# Patient Record
(demographics unavailable — no encounter records)

---

## 2024-12-28 NOTE — REVIEW OF SYSTEMS
[Fatigue] : fatigue [Muscle Weakness] : muscle weakness [Confused] : confusion [Dizziness] : no dizziness [Difficulty Walking] : difficulty walking [Negative] : Integumentary

## 2024-12-28 NOTE — HISTORY OF PRESENT ILLNESS
[de-identified] : Neri Robledo  is an 84-year-old male with a history of metastatic prostate cancer, heart failure, hypertension, tobacco use, aortic stenosis with TAVR, and extensive prior treatment for his cancer, presenting to establish care and continue management. His prostate cancer was initially diagnosed several years ago and treated with brachytherapy and androgen deprivation therapy (ADT), though details of these treatments are unclear due to limited records.  In November 2021, during evaluation for cardiac issues at [facility], his PSA was elevated to 1,145. He was subsequently started on degarelix in January 2022 and enzalutamide. However, in April 2022, enzalutamide was discontinued following hospitalization for dyspnea and dizziness, and he was transitioned to abiraterone and prednisone. By July 2022, his PSA had decreased to 0.20. His most recent follow-up with Dr. Singh at [place] in July 2024 noted a PSA of 0.3. It is unclear when he last received degarelix since his last injection in July 2024, but nursing home records confirm he has continued abiraterone and prednisone.  The patient reports stable symptoms, with no new bony pain, chest pain, shortness of breath, abdominal pain, or changes in urinary or bowel habits. He denies neurologic symptoms.

## 2024-12-28 NOTE — ASSESSMENT
[FreeTextEntry1] : Neri Robledo is an 84-year-old male with metastatic prostate cancer and a complex medical history, presenting to establish care and continue hormonal therapy.    I reviewed prior treatment records, including degarelix, abiraterone, and prednisone. Repeat labs performed 12/26/24 show stable PSA from previous, with undetectable PSA. We will plan to resume degarelix in one week. He will continue abiraterone/prednisone at nursing home.  We will defer repeat imaging given lack of focal symptoms concerning for disease progression. If PSA increases or new symptoms arise, plan for CT chest/abdomen/pelvis and bone scan.       We will need to further evaluate his goals of care and health care proxy status at subsequent visit, as he appears to have limited capacity and arrived without a healthcare proxy today.      #Metastatic prostate cancer: -continue degarelix + amy/pred -monitor closely for toxicity -RTC  1 week for degarelix, 5 weeks visit + degarelix

## 2024-12-28 NOTE — REASON FOR VISIT
[Initial Consultation] : an initial consultation [Family Member] : family member [FreeTextEntry2] : Metastatic [facility]

## 2025-01-25 NOTE — PHYSICAL EXAM
No glasses Prescription given to patient. [Capable of only limited self care, confined to bed or chair more than 50% of waking hours] : Status 3- Capable of only limited self care, confined to bed or chair more than 50% of waking hours [Thin] : thin [Normal] : affect appropriate

## 2025-01-28 NOTE — ASSESSMENT
[FreeTextEntry1] : Neri Robledo is an 84-year-old male with metastatic prostate cancer and a complex medical history, presenting to establish care and continue hormonal therapy.  I previously reviewed prior treatment records, including degarelix, abiraterone, and prednisone. Repeat labs performed 12/26/24 show stable PSA from previous. We will plan to continue current regimen with degarelix every 4 weeks and amy/pred.  We will defer repeat imaging given lack of focal symptoms concerning for disease progression. If PSA increases or new symptoms arise, plan for CT chest/abdomen/pelvis and bone scan.  #Metastatic prostate cancer: -continue degarelix every 4 weeks -continue abiraterone 1000mg and prednisone 5mg daily -monitor closely for toxicity -RTC  4 weeks for degarelix only, 8 weeks for follow up visit and labs and degarelix

## 2025-01-28 NOTE — HISTORY OF PRESENT ILLNESS
[de-identified] : Neri Robledo  is an 84-year-old male with a history of metastatic prostate cancer, heart failure, hypertension, tobacco use, aortic stenosis with TAVR, and extensive prior treatment for his cancer, presenting to establish care and continue management. His prostate cancer was initially diagnosed several years ago and treated with brachytherapy and androgen deprivation therapy (ADT), though details of these treatments are unclear due to limited records.  In November 2021, during evaluation for cardiac issues at [facility], his PSA was elevated to 1,145. He was subsequently started on degarelix in January 2022 and enzalutamide. However, in April 2022, enzalutamide was discontinued following hospitalization for dyspnea and dizziness, and he was transitioned to abiraterone and prednisone. By July 2022, his PSA had decreased to 0.20. His most recent follow-up with Dr. Singh at [place] in July 2024 noted a PSA of 0.3. It is unclear when he last received degarelix since his last injection in July 2024, but nursing home records confirm he has continued abiraterone and prednisone.  The patient reports stable symptoms, with no new bony pain, chest pain, shortness of breath, abdominal pain, or changes in urinary or bowel habits. He denies neurologic symptoms. [de-identified] : He presents for follow up and to continue therapy. He denies any symptoms since his last visit. No new bony pain, chest pain, shortness of breath, abdominal pain, or changes in urinary or bowel habits. He denies neurologic symptoms.

## 2025-01-28 NOTE — HISTORY OF PRESENT ILLNESS
[de-identified] : Neri Robledo  is an 84-year-old male with a history of metastatic prostate cancer, heart failure, hypertension, tobacco use, aortic stenosis with TAVR, and extensive prior treatment for his cancer, presenting to establish care and continue management. His prostate cancer was initially diagnosed several years ago and treated with brachytherapy and androgen deprivation therapy (ADT), though details of these treatments are unclear due to limited records.  In November 2021, during evaluation for cardiac issues at [facility], his PSA was elevated to 1,145. He was subsequently started on degarelix in January 2022 and enzalutamide. However, in April 2022, enzalutamide was discontinued following hospitalization for dyspnea and dizziness, and he was transitioned to abiraterone and prednisone. By July 2022, his PSA had decreased to 0.20. His most recent follow-up with Dr. Singh at [place] in July 2024 noted a PSA of 0.3. It is unclear when he last received degarelix since his last injection in July 2024, but nursing home records confirm he has continued abiraterone and prednisone.  The patient reports stable symptoms, with no new bony pain, chest pain, shortness of breath, abdominal pain, or changes in urinary or bowel habits. He denies neurologic symptoms. [de-identified] : He presents for follow up and to continue therapy. He denies any symptoms since his last visit. No new bony pain, chest pain, shortness of breath, abdominal pain, or changes in urinary or bowel habits. He denies neurologic symptoms.

## 2025-01-28 NOTE — REVIEW OF SYSTEMS
[Fatigue] : fatigue [Muscle Weakness] : muscle weakness [Confused] : confusion [Difficulty Walking] : difficulty walking [Negative] : Integumentary [Dizziness] : no dizziness

## 2025-01-28 NOTE — HISTORY OF PRESENT ILLNESS
[de-identified] : Neri Robledo  is an 84-year-old male with a history of metastatic prostate cancer, heart failure, hypertension, tobacco use, aortic stenosis with TAVR, and extensive prior treatment for his cancer, presenting to establish care and continue management. His prostate cancer was initially diagnosed several years ago and treated with brachytherapy and androgen deprivation therapy (ADT), though details of these treatments are unclear due to limited records.  In November 2021, during evaluation for cardiac issues at [facility], his PSA was elevated to 1,145. He was subsequently started on degarelix in January 2022 and enzalutamide. However, in April 2022, enzalutamide was discontinued following hospitalization for dyspnea and dizziness, and he was transitioned to abiraterone and prednisone. By July 2022, his PSA had decreased to 0.20. His most recent follow-up with Dr. Singh at [place] in July 2024 noted a PSA of 0.3. It is unclear when he last received degarelix since his last injection in July 2024, but nursing home records confirm he has continued abiraterone and prednisone.  The patient reports stable symptoms, with no new bony pain, chest pain, shortness of breath, abdominal pain, or changes in urinary or bowel habits. He denies neurologic symptoms. [de-identified] : He presents for follow up and to continue therapy. He denies any symptoms since his last visit. No new bony pain, chest pain, shortness of breath, abdominal pain, or changes in urinary or bowel habits. He denies neurologic symptoms.

## 2025-03-29 NOTE — HISTORY OF PRESENT ILLNESS
[de-identified] : Neri Robledo  is an 84-year-old male with a history of metastatic prostate cancer, heart failure, hypertension, tobacco use, aortic stenosis with TAVR, and extensive prior treatment for his cancer, presenting to establish care and continue management. His prostate cancer was initially diagnosed several years ago and treated with brachytherapy and androgen deprivation therapy (ADT), though details of these treatments are unclear due to limited records.  In November 2021, during evaluation for cardiac issues at [facility], his PSA was elevated to 1,145. He was subsequently started on degarelix in January 2022 and enzalutamide. However, in April 2022, enzalutamide was discontinued following hospitalization for dyspnea and dizziness, and he was transitioned to abiraterone and prednisone. By July 2022, his PSA had decreased to 0.20. His most recent follow-up with Dr. Singh at [place] in July 2024 noted a PSA of 0.3. It is unclear when he last received degarelix since his last injection in July 2024, but nursing home records confirm he has continued abiraterone and prednisone.  The patient reports stable symptoms, with no new bony pain, chest pain, shortness of breath, abdominal pain, or changes in urinary or bowel habits. He denies neurologic symptoms. [de-identified] : He presents for follow up and to continue therapy. He denies any symptoms since his last visit. No new bony pain, chest pain, shortness of breath, abdominal pain, or changes in urinary or bowel habits. He denies neurologic symptoms.

## 2025-03-29 NOTE — HISTORY OF PRESENT ILLNESS
[de-identified] : Neri Robledo  is an 84-year-old male with a history of metastatic prostate cancer, heart failure, hypertension, tobacco use, aortic stenosis with TAVR, and extensive prior treatment for his cancer, presenting to establish care and continue management. His prostate cancer was initially diagnosed several years ago and treated with brachytherapy and androgen deprivation therapy (ADT), though details of these treatments are unclear due to limited records.  In November 2021, during evaluation for cardiac issues at [facility], his PSA was elevated to 1,145. He was subsequently started on degarelix in January 2022 and enzalutamide. However, in April 2022, enzalutamide was discontinued following hospitalization for dyspnea and dizziness, and he was transitioned to abiraterone and prednisone. By July 2022, his PSA had decreased to 0.20. His most recent follow-up with Dr. Singh at [place] in July 2024 noted a PSA of 0.3. It is unclear when he last received degarelix since his last injection in July 2024, but nursing home records confirm he has continued abiraterone and prednisone.  The patient reports stable symptoms, with no new bony pain, chest pain, shortness of breath, abdominal pain, or changes in urinary or bowel habits. He denies neurologic symptoms. [de-identified] : He presents for follow up and to continue therapy. He denies any symptoms since his last visit. No new bony pain, chest pain, shortness of breath, abdominal pain, or changes in urinary or bowel habits. He denies neurologic symptoms.

## 2025-03-29 NOTE — ASSESSMENT
[FreeTextEntry1] : Neri Robledo is an 84-year-old male with metastatic prostate cancer and a complex medical history, presenting to establish care and continue hormonal therapy.  I previously reviewed prior treatment records, including degarelix, abiraterone, and prednisone. Repeat labs show stable PSA from previous.   We will plan to continue current regimen with degarelix every 4 weeks and amy/pred.  We will defer repeat imaging given lack of focal symptoms concerning for disease progression. If PSA increases or new symptoms arise, plan for CT chest/abdomen/pelvis and bone scan.  #Metastatic prostate cancer: -continue degarelix every 4 weeks -continue abiraterone 1000mg and prednisone 5mg daily -monitor closely for toxicity -RTC  4 weeks for degarelix only, 8 weeks for follow up visit and labs and degarelix

## 2025-07-05 NOTE — HISTORY OF PRESENT ILLNESS
[de-identified] : Neri Robledo  is an 84-year-old male with a history of metastatic prostate cancer, heart failure, hypertension, tobacco use, aortic stenosis with TAVR, and extensive prior treatment for his cancer, presenting to establish care and continue management. His prostate cancer was initially diagnosed several years ago and treated with brachytherapy and androgen deprivation therapy (ADT), though details of these treatments are unclear due to limited records.  In November 2021, during evaluation for cardiac issues at [place], his PSA was elevated to 1,145. He was subsequently started on degarelix in January 2022 and enzalutamide. However, in April 2022, enzalutamide was discontinued following hospitalization for dyspnea and dizziness, and he was transitioned to abiraterone and prednisone. By July 2022, his PSA had decreased to 0.20. His most recent follow-up with Dr. Singh at [place] in July 2024 noted a PSA of 0.3. It is unclear when he last received degarelix since his last injection in July 2024, but nursing home records confirm he has continued abiraterone and prednisone.  The patient reports stable symptoms, with no new bony pain, chest pain, shortness of breath, abdominal pain, or changes in urinary or bowel habits. He denies neurologic symptoms. [de-identified] : He presents for follow up and to continue therapy. No new bony pain, chest pain, shortness of breath, abdominal pain, or changes in urinary or bowel habits. He denies neurologic symptoms.

## 2025-07-05 NOTE — HISTORY OF PRESENT ILLNESS
[de-identified] : Neri Robledo  is an 84-year-old male with a history of metastatic prostate cancer, heart failure, hypertension, tobacco use, aortic stenosis with TAVR, and extensive prior treatment for his cancer, presenting to establish care and continue management. His prostate cancer was initially diagnosed several years ago and treated with brachytherapy and androgen deprivation therapy (ADT), though details of these treatments are unclear due to limited records.  In November 2021, during evaluation for cardiac issues at [place], his PSA was elevated to 1,145. He was subsequently started on degarelix in January 2022 and enzalutamide. However, in April 2022, enzalutamide was discontinued following hospitalization for dyspnea and dizziness, and he was transitioned to abiraterone and prednisone. By July 2022, his PSA had decreased to 0.20. His most recent follow-up with Dr. Singh at [place] in July 2024 noted a PSA of 0.3. It is unclear when he last received degarelix since his last injection in July 2024, but nursing home records confirm he has continued abiraterone and prednisone.  The patient reports stable symptoms, with no new bony pain, chest pain, shortness of breath, abdominal pain, or changes in urinary or bowel habits. He denies neurologic symptoms. [de-identified] : He presents for follow up and to continue therapy. No new bony pain, chest pain, shortness of breath, abdominal pain, or changes in urinary or bowel habits. He denies neurologic symptoms.

## 2025-07-05 NOTE — ASSESSMENT
[FreeTextEntry1] : Neri Robledo is an 84-year-old male with metastatic prostate cancer and a complex medical history, presenting to establish care and continue hormonal therapy.  I previously reviewed prior treatment records, including degarelix, abiraterone, and prednisone. Repeat labs show stable PSA from previous.   We will plan to continue current regimen with degarelix every 4 weeks along with amy/pred. If PSA increases or new symptoms arise, plan for CT chest/abdomen/pelvis and bone scan.  Repeat PSA today.  #Metastatic prostate cancer: -continue degarelix every 4 weeks -continue abiraterone 1000mg and prednisone 5mg daily -monitor closely for toxicity -RTC  4 weeks for degarelix + visit, 8 weeks for visit with MAHESH Green and labs and degarelix